# Patient Record
Sex: MALE | Race: WHITE | NOT HISPANIC OR LATINO | Employment: FULL TIME | URBAN - NONMETROPOLITAN AREA
[De-identification: names, ages, dates, MRNs, and addresses within clinical notes are randomized per-mention and may not be internally consistent; named-entity substitution may affect disease eponyms.]

---

## 2020-03-04 ENCOUNTER — OCCUPATIONAL MEDICINE (OUTPATIENT)
Dept: URGENT CARE | Facility: PHYSICIAN GROUP | Age: 25
End: 2020-03-04
Payer: COMMERCIAL

## 2020-03-04 VITALS
DIASTOLIC BLOOD PRESSURE: 78 MMHG | HEART RATE: 68 BPM | TEMPERATURE: 98.2 F | OXYGEN SATURATION: 97 % | BODY MASS INDEX: 39.96 KG/M2 | RESPIRATION RATE: 16 BRPM | HEIGHT: 72 IN | SYSTOLIC BLOOD PRESSURE: 116 MMHG | WEIGHT: 295 LBS

## 2020-03-04 DIAGNOSIS — T15.92XA FOREIGN BODY OF LEFT EYE, INITIAL ENCOUNTER: ICD-10-CM

## 2020-03-04 PROCEDURE — 99204 OFFICE O/P NEW MOD 45 MIN: CPT | Performed by: PHYSICIAN ASSISTANT

## 2020-03-04 RX ORDER — ERYTHROMYCIN 5 MG/G
OINTMENT OPHTHALMIC
Qty: 1 TUBE | Refills: 0 | Status: SHIPPED | OUTPATIENT
Start: 2020-03-04 | End: 2022-10-25

## 2020-03-04 NOTE — LETTER
[facility]  [address] [place] [address] Sofia  CINDY Hall [address]  Phone:  [phone] - Fax:  [phone]   [facility] Progress Report and Disability Certification  Date of Service: 3/4/2020   No Show:  No  Date / Time of Next Visit: 3/18/2020   Claim Information   Patient Name: Maciej Cosme  Claim Number:     Employer:   [employer]  Date of Injury: 3/2/2020     Insurer / TPA: [employer]  ID / SSN:     Occupation:   Diagnosis: The encounter diagnosis was Foreign body of left eye, initial encounter.    Medical Information   Related to Industrial Injury? Yes    Subjective Complaints:  DOI: 3/2/2020, initial evaluation  FB sensation in left eye started 3/2/2020 without specific incident. Has slightly worsened. Denies pain and specific foreign body sensation, describes only itching. Slightly light sensitive, causes tearing with bright lights. Other places of employment: none. Contact lenses: none. Denies blurry vision and history of eye issues.  Feels he can work unrestricted.   Objective Findings: mild conjunctival injection noted in the left eye only with excessive tearing.  Speck of a black foreign body is noted in the left eye at the border of the iris at approximately the 9 o'clock position.  Unable to palpate foreign body with the smooth edge of an 18-gauge needle.  Fluorescein uptake is noted only in this area.   Pre-Existing Condition(s):     Assessment:   Initial Visit    Status: Additional Care Required  Permanent Disability:No    Plan: Medication    Diagnostics:      Comments:  Assessment/Plan:  Patient feels as though he is able to work unrestricted.  Referring patient optometry for further evaluation management of the foreign body.  Return to clinic in 2 weeks for reevaluation, sooner for any significant changes in sympto  ms.  Ointment provided for comfort until seen by optometry.  1. Foreign body of left eye, initial encounter  REFERRAL TO  OPTOMETRY        Disability Information   Status: Released to Full Duty    From:  3/4/2020  Through: 3/18/2020 Restrictions are:     Physical Restrictions   Sitting:    Standing:    Stooping:    Bending:      Squatting:    Walking:    Climbing:    Pushing:      Pulling:    Other:    Reaching Above Shoulder (L):   Reaching Above Shoulder (R):       Reaching Below Shoulder (L):    Reaching Below Shoulder (R):      Not to exceed Weight Limits   Carrying(hrs):   Weight Limit(lb):   Lifting(hrs):   Weight  Limit(lb):     Comments:      Repetitive Actions   Hands: i.e. Fine Manipulations from Grasping:     Feet: i.e. Operating Foot Controls:     Driving / Operate Machinery:     Physician Name: Gwen Matthews P.A.-C. Physician Signature: GWEN Banks P.A.-C. e-Signature: Dr. Bright Napoles, Medical Director   Clinic Name / Location: [address] [address] Clinic Phone Number: Dept: [phone]   Appointment Time: 1:00 Pm Visit Start Time: 1:12 PM   Check-In Time:  1:09 Pm Visit Discharge Time:  1:47 pm    Original-Treating Physician or Chiropractor    Page 2-Insurer/TPA    Page 3-Employer    Page 4-Employee

## 2020-03-04 NOTE — PROGRESS NOTES
Chief Complaint   Patient presents with   • Foreign Body in Eye       HISTORY OF PRESENT ILLNESS: Patient is a 24 y.o. male who presents today for the following:    DOI: 3/2/2020, initial evaluation  FB sensation in left eye started 3/2/2020 without specific incident. Has slightly worsened. Denies pain and specific foreign body sensation, describes only itching. Slightly light sensitive, causes tearing with bright lights. Other places of employment: none. Contact lenses: none. Denies blurry vision and history of eye issues.  Feels he can work unrestricted.    Allergies:Patient has no known allergies.    PMH: No pertinent past medical history to this problem  MEDS: Medications were reviewed in Epic  ALLERGIES: Allergies were reviewed in Epic  SOCHX: Works as a  for [employer]  FH: No pertinent family history to this problem    Review of Systems:  Constitutional ROS: No unexpected change in weight, No weakness, No fatigue  Eye ROS: Left eye itching.  Skin/Integumentary ROS: No edema, No evidence of rash, No itching      Exam:  /78 (BP Location: Right arm, Patient Position: Sitting, BP Cuff Size: Large adult)   Pulse 68   Temp 36.8 °C (98.2 °F) (Temporal)   Resp 16   Ht 1.829 m (6')   Wt (!) 133.8 kg (295 lb)   SpO2 97%   General: Well developed, well nourished. No distress.  Eye: PERRL/EOMI; mild conjunctival injection noted in the left eye only with excessive tearing.  Speck of a black foreign body is noted in the left eye at the border of the iris at approximately the 9 o'clock position.  Unable to palpate foreign body with the smooth edge of an 18-gauge needle.  Fluorescein uptake is noted only in this area.  Pulmonary: Unlabored respiratory effort.   Neurologic: Grossly nonfocal. No facial asymmetry noted.  Skin: Warm, dry, good turgor. No rashes in visible areas.   Psych: Normal mood. Alert and oriented x3. Judgment and insight is normal.    Visual acuity: 20/20 both individually and  together, corrected    Assessment/Plan:  Patient feels as though he is able to work unrestricted.  Referring patient optometry for further evaluation management of the foreign body.  Return to clinic in 2 weeks for reevaluation, sooner for any significant changes in symptoms.    Contact information provided for a local eye care center in [place], [place], that will frequently see Worker's Compensation even prior to approval.  1. Foreign body of left eye, initial encounter  REFERRAL TO OPTOMETRY    erythromycin 5 MG/GM Ointment

## 2020-03-04 NOTE — LETTER
EMPLOYEE’S CLAIM FOR COMPENSATION/ REPORT OF INITIAL TREATMENT  FORM C-4    EMPLOYEE’S CLAIM - PROVIDE ALL INFORMATION REQUESTED   First Name  Maciej Last Name  Carmelina Birthdate                    1995                Sex  male Claim Number   Home Address  [address] Age  24 y.o. Height  1.829 m (6') Weight  (!) 133.8 kg (295 lb) N     [facility] Zip  [address] Telephone  [phone] (home)    Mailing Address  [address] [facility] Zip  [address] Primary Language Spoken  English    Insurer  NA Third Party   [employer]   Employee's Occupation (Job Title) When Injury or Occupational Disease Occurred      Employer's Name    [employer]  Telephone   [phone]   Employer Address   [address] [place] [address]   [id number]   Date of Injury  3/2/2020               Hour of Injury  1:40 PM Date Employer Notified  3/4/2020 Last Day of Work after Injury or Occupational Disease  3/4/2020 Supervisor to Whom Injury Reported  Damion Gonzáles   Address or Location of Accident (if applicable)  [[address] [place] [address]]   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 (Be specific an answer in detail. Use additional sheet if necessary)   debris or hot slag from welding landed in my eye.   If you believe that you have an occupational disease, when did you first have knowledge of the disability and it relationship to your employment?  NA Witnesses to the Accident  NA      Nature of Injury or Occupational Disease  Foreign Body  Part(s) of Body Injured or Affected  Eye (L), ,     I certify that the above is true and correct to the best of my knowledge and that I have provided this information in order to obtain the benefits of [place]  I hereby authorize any physician, chiropractor, surgeon, practitioner, or other person, any hospital, including [facility] or [facility] hospital, any medical service organization, any insurance company, or other institution or organization to release to each other, any medical or other information, including benefits paid or payable, pertinent to this injury or disease, except information relative to diagnosis, treatment and/or counseling for AIDS, psychological conditions, alcohol or controlled substances, for which I must give specific authorization.  A Photostat of this authorization shall be as valid as the original.     Date 03/04/2020   Place [facility]   Employee’s Signature   THIS REPORT MUST BE COMPLETED AND MAILED WITHIN 3 WORKING DAYS OF TREATMENT   Place  [facility]  Name of Facility  [facility]   Date  3/4/2020 Diagnosis  (T15.92XA) Foreign body of left eye, initial encounter Is there evidence the injured employee was under the influence of alcohol and/or another controlled substance at the time of accident?   Hour  1:12 PM Description of Injury or Disease  The encounter diagnosis was Foreign body of left eye, initial encounter. No   Treatment  Assessment/Plan:  Patient feels as though he is able to work unrestricted.  Referring patient optometry for further evaluation management of the foreign body.  Return to clinic in 2 weeks for reevaluation, sooner for any significant changes in symptoms.  Erythromycin ointment provided for comfort until seen by optometry.  1. Foreign body of left eye, initial encounter  REFERRAL TO OPTOMETRY      Have you advised the patient to remain off work five days or more? No   X-Ray Findings      If Yes   From Date  To Date      From information given by the employee, together with medical evidence, can you directly connect this injury or occupational disease as job incurred?  Yes If No Full Duty Yes  "Modified Duty      Is additional medical care by a physician indicated?  Yes If Modified Duty, Specify any Limitations / Restrictions      Do you know of any previous injury or disease contributing to this condition or occupational disease?                            No   Date  3/4/2020 Print Doctor’s Name Gwen Matthews P.A.-C. I certify the employer’s copy of  this form was mailed on:   Address  [address] Insurer’s Use Only     [facility]  [address]    Provider’s Tax ID Number  [id number] Telephone  Dept: [phone]        e-GWEN Good P.A.-C.   e-Signature: Dr. Bright Napoles,   Medical Director Degree  MD        ORIGINAL-TREATING PHYSICIAN OR CHIROPRACTOR    PAGE 2-INSURER/TPA    PAGE 3-EMPLOYER    PAGE 4-EMPLOYEE             Form C-4 (rev.10/07)              BRIEF DESCRIPTION OF RIGHTS AND BENEFITS  (Pursuant to [place]RS 616C.050)    Notice of Injury or Occupational Disease (Incident Report Form C-1): If an injury or occupational disease (OD) arises out of and in the course of employment, you must provide written notice to your employer as soon as practicable, but no later than 7 days after the accident or OD. Your employer shall maintain a sufficient supply of the required forms.    Claim for Compensation (Form C-4): If medical treatment is sought, the form C-4 is available at the place of initial treatment. A completed \"Claim for Compensation\" (Form C-4) must be filed within 90 days after an accident or OD. The treating physician or chiropractor must, within 3 working days after treatment, complete and mail to the employer, the employer's insurer and third-party , the Claim for Compensation.    Medical Treatment: If you require medical treatment for your on-the-job injury or OD, you may be required to select a physician or chiropractor from a list provided by your workers’ compensation insurer, if it has contracted with an Organization for Managed Care (MCO) or " Preferred Provider Organization (PPO) or providers of health care. If your employer has not entered into a contract with an MCO or PPO, you may select a physician or chiropractor from the Panel of Physicians and Chiropractors. Any medical costs related to your industrial injury or OD will be paid by your insurer.    Temporary Total Disability (TTD): If your doctor has certified that you are unable to work for a period of at least 5 consecutive days, or 5 cumulative days in a 20-day period, or places restrictions on you that your employer does not accommodate, you may be entitled to TTD compensation.    Temporary Partial Disability (TPD): If the wage you receive upon reemployment is less than the compensation for TTD to which you are entitled, the insurer may be required to pay you TPD compensation to make up the difference. TPD can only be paid for a maximum of 24 months.    Permanent Partial Disability (PPD): When your medical condition is stable and there is an indication of a PPD as a result of your injury or OD, within 30 days, your insurer must arrange for an evaluation by a rating physician or chiropractor to determine the degree of your PPD. The amount of your PPD award depends on the date of injury, the results of the PPD evaluation and your age and wage.    Permanent Total Disability (PTD): If you are medically certified by a treating physician or chiropractor as permanently and totally disabled and have been granted a PTD status by your insurer, you are entitled to receive monthly benefits not to exceed 66 2/3% of your average monthly wage. The amount of your PTD payments is subject to reduction if you previously received a PPD award.    Vocational Rehabilitation Services: You may be eligible for vocational rehabilitation services if you are unable to return to the job due to a permanent physical impairment or permanent restrictions as a result of your injury or occupational disease.    Transportation and  Per Bonnie Reimbursement: You may be eligible for travel expenses and per bonnie associated with medical treatment.    Reopening: You may be able to reopen your claim if your condition worsens after claim closure.    Appeal Process: If you disagree with a written determination issued by the insurer or the insurer does not respond to your request, you may appeal to the Department of Administration, , by following the instructions contained in your determination letter. You must appeal the determination within 70 days from the date of the determination letter at [address], [address], [place], [place] [address], or [address], [address], [place], [place] [address]. If you disagree with the  decision, you may appeal to the Department of Administration, . You must file your appeal within 30 days from the date of the  decision letter at [address], [address], [place], [place] [address], or [address], [place], [place] [address]. If you disagree with a decision of an , you may file a petition for judicial review with the District Court. You must do so within 30 days of the Appeal Officer’s decision. You may be represented by an  at your own expense or you may contact the [facility] for possible representation.    [place]  for Injured Workers (NAIW): If you disagree with a  decision, you may request that NAIW represent you without charge at an  Hearing. For information regarding denial of benefits, you may contact the [facility] at: [address], [address], [place] [address], [phone], or [address], [place] [address], [phone]    To File a Complaint with the Division: If you wish to file a complaint with the  of the Division of Industrial Relations (DIR),  please contact the Workers’ Compensation Section, [address], [address], [place], [place] [address], telephone [phone]) [phone], or [address], [address], [place], [place] [address], telephone [phone].    For assistance with Workers’ Compensation Issues: You may contact the Office of the [facility] Consumer Health Assistance, [address] [place] [address], [address], [place], [place] [address], Toll Free 1-755.798.2582, Web site: http://[employer].[facility].nv., E-mail [email or web address].           03/04/2020                 __________________________________________________________________                                                     _________        Employee Name / Signature                                                                                                                                              Date                                                                                                                                                                                                     D-2 (rev. 06/18)

## 2022-10-25 ENCOUNTER — OFFICE VISIT (OUTPATIENT)
Dept: URGENT CARE | Facility: PHYSICIAN GROUP | Age: 27
End: 2022-10-25
Payer: COMMERCIAL

## 2022-10-25 VITALS
DIASTOLIC BLOOD PRESSURE: 72 MMHG | OXYGEN SATURATION: 97 % | RESPIRATION RATE: 18 BRPM | SYSTOLIC BLOOD PRESSURE: 128 MMHG | WEIGHT: 249 LBS | TEMPERATURE: 96.6 F | HEIGHT: 74 IN | HEART RATE: 105 BPM | BODY MASS INDEX: 31.95 KG/M2

## 2022-10-25 DIAGNOSIS — J10.1 INFLUENZA B: ICD-10-CM

## 2022-10-25 DIAGNOSIS — J06.9 URI WITH COUGH AND CONGESTION: ICD-10-CM

## 2022-10-25 DIAGNOSIS — J02.9 PHARYNGITIS, UNSPECIFIED ETIOLOGY: ICD-10-CM

## 2022-10-25 DIAGNOSIS — R19.7 DIARRHEA, UNSPECIFIED TYPE: ICD-10-CM

## 2022-10-25 LAB
EXTERNAL QUALITY CONTROL: NORMAL
FLUAV+FLUBV AG SPEC QL IA: NORMAL
INT CON NEG: NORMAL
INT CON POS: NORMAL
S PYO AG THROAT QL: NEGATIVE
SARS-COV+SARS-COV-2 AG RESP QL IA.RAPID: NEGATIVE

## 2022-10-25 PROCEDURE — 87426 SARSCOV CORONAVIRUS AG IA: CPT | Performed by: NURSE PRACTITIONER

## 2022-10-25 PROCEDURE — 99213 OFFICE O/P EST LOW 20 MIN: CPT | Performed by: NURSE PRACTITIONER

## 2022-10-25 PROCEDURE — 87880 STREP A ASSAY W/OPTIC: CPT | Performed by: NURSE PRACTITIONER

## 2022-10-25 PROCEDURE — 87804 INFLUENZA ASSAY W/OPTIC: CPT | Performed by: NURSE PRACTITIONER

## 2022-10-25 ASSESSMENT — ENCOUNTER SYMPTOMS
CHILLS: 0
SINUS PAIN: 0
NAUSEA: 0
DIARRHEA: 1
BLOOD IN STOOL: 0
SORE THROAT: 1
WHEEZING: 0
HEMOPTYSIS: 0
COUGH: 1
FEVER: 0
MYALGIAS: 1
SPUTUM PRODUCTION: 0
VOMITING: 0
SHORTNESS OF BREATH: 0
ABDOMINAL PAIN: 0

## 2022-10-25 NOTE — LETTER
October 25, 2022         Patient: Maciej Cosme   YOB: 1995   Date of Visit: 10/25/2022           To Whom it May Concern:    Maciej Cosme was seen in my clinic on 10/25/2022 for influenza (the flu).  Please excuse his absence due to acute illness. He may return to work in 2-3 days when symptoms improve.    If you have any questions or concerns, please don't hesitate to call.        Sincerely,           LETICIA Harden.  Electronically Signed

## 2022-10-25 NOTE — PROGRESS NOTES
"Subjective     Maciej Cosme is a 27 y.o. male who presents with Sinus Problem (X1 week, sinus issues, cough, weak, fatigue, Sunday diarrhea/ )            Maciej comes in today with a 1 week history of URI with nasal congestion, sore throat, cough, weakness, fatigue and new onset of diarrhea 2 days ago.  He is taking OTC cold meds with no relief.  He denies any known exposure to strep throat, covid or influenza.        Review of Systems   Constitutional:  Positive for malaise/fatigue. Negative for chills and fever.   HENT:  Positive for congestion and sore throat. Negative for ear pain and sinus pain.    Respiratory:  Positive for cough. Negative for hemoptysis, sputum production, shortness of breath and wheezing.    Cardiovascular:  Negative for chest pain.   Gastrointestinal:  Positive for diarrhea. Negative for abdominal pain, blood in stool, melena, nausea and vomiting.   Musculoskeletal:  Positive for myalgias.      Medications, Allergies, and current problem list reviewed today in Epic      Objective     Blood Pressure 128/72   Pulse (Abnormal) 105   Temperature 35.9 °C (96.6 °F) (Temporal)   Respiration 18   Height 1.88 m (6' 2\")   Weight 113 kg (249 lb)   Oxygen Saturation 97%   Body Mass Index 31.97 kg/m²      Physical Exam  Vitals reviewed.   Constitutional:       General: He is not in acute distress.     Appearance: He is well-developed. He is not ill-appearing, toxic-appearing or diaphoretic.      Comments: Fatigued.   HENT:      Right Ear: Tympanic membrane, ear canal and external ear normal. There is no impacted cerumen.      Left Ear: Tympanic membrane, ear canal and external ear normal. There is no impacted cerumen.      Nose: Congestion and rhinorrhea present.      Comments: No sinus TTP.     Mouth/Throat:      Mouth: Mucous membranes are moist.      Pharynx: Posterior oropharyngeal erythema present. No oropharyngeal exudate.   Eyes:      General: No scleral icterus.        Right eye: No " discharge.         Left eye: No discharge.      Conjunctiva/sclera: Conjunctivae normal.   Neck:      Thyroid: No thyromegaly.      Vascular: No JVD.      Trachea: No tracheal deviation.   Cardiovascular:      Rate and Rhythm: Regular rhythm. Tachycardia present.      Heart sounds: Normal heart sounds. No murmur heard.    No friction rub. No gallop.   Pulmonary:      Effort: Pulmonary effort is normal. No respiratory distress.      Breath sounds: Normal breath sounds. No stridor. No wheezing, rhonchi or rales.      Comments: Non-productive cough in clinic.  Chest:      Chest wall: No tenderness.   Musculoskeletal:      Cervical back: Neck supple.   Lymphadenopathy:      Cervical: Cervical adenopathy present.   Skin:     General: Skin is warm and dry.      Coloration: Skin is not jaundiced or pale.      Findings: No rash.   Neurological:      Mental Status: He is alert and oriented to person, place, and time.           POCT rapid strep a: negative  POCT covid antigen:negative  POCT influenza a/b: Positive for B (negative for a)                Assessment & Plan        1. Influenza B        2. URI with cough and congestion  POCT SARS-COV Antigen SALOME (Symptomatic only)    POCT Influenza A/B      3. Pharyngitis, unspecified etiology  POCT Rapid Strep A      4. Diarrhea, unspecified type            Advised Maciej that based on the history and exam findings, this is likely a self-limiting viral illness.  There is no indication for antibiotics at this time.  Out of range to benefit from tamiflu.  Differential reviewed.    OTC NSAIDs or tylenol prn fever, pain.  OTC cold medications prn symptom management.  Maintain adequate po hydration.  RTC in 5-7 days if symptoms persist, sooner if worse.  He verbalized understanding of and agreed with plan of care.

## 2022-12-30 ENCOUNTER — APPOINTMENT (OUTPATIENT)
Dept: URGENT CARE | Facility: PHYSICIAN GROUP | Age: 27
End: 2022-12-30
Payer: COMMERCIAL

## 2022-12-31 ENCOUNTER — OFFICE VISIT (OUTPATIENT)
Dept: URGENT CARE | Facility: PHYSICIAN GROUP | Age: 27
End: 2022-12-31
Payer: COMMERCIAL

## 2022-12-31 VITALS
SYSTOLIC BLOOD PRESSURE: 120 MMHG | TEMPERATURE: 96.9 F | DIASTOLIC BLOOD PRESSURE: 80 MMHG | HEART RATE: 84 BPM | BODY MASS INDEX: 39.91 KG/M2 | OXYGEN SATURATION: 96 % | WEIGHT: 311 LBS | HEIGHT: 74 IN | RESPIRATION RATE: 18 BRPM

## 2022-12-31 DIAGNOSIS — J22 ACUTE RESPIRATORY INFECTION: ICD-10-CM

## 2022-12-31 DIAGNOSIS — Z11.52 ENCOUNTER FOR SCREENING FOR COVID-19: ICD-10-CM

## 2022-12-31 DIAGNOSIS — R68.89 FLU-LIKE SYMPTOMS: ICD-10-CM

## 2022-12-31 DIAGNOSIS — J02.9 ACUTE PHARYNGITIS, UNSPECIFIED ETIOLOGY: ICD-10-CM

## 2022-12-31 LAB
EXTERNAL QUALITY CONTROL: NORMAL
FLUAV+FLUBV AG SPEC QL IA: NORMAL
INT CON NEG: NORMAL
INT CON POS: NORMAL
S PYO AG THROAT QL: NORMAL
SARS-COV+SARS-COV-2 AG RESP QL IA.RAPID: NEGATIVE

## 2022-12-31 PROCEDURE — 99213 OFFICE O/P EST LOW 20 MIN: CPT | Mod: CS | Performed by: FAMILY MEDICINE

## 2022-12-31 PROCEDURE — 87804 INFLUENZA ASSAY W/OPTIC: CPT | Performed by: FAMILY MEDICINE

## 2022-12-31 PROCEDURE — 87880 STREP A ASSAY W/OPTIC: CPT | Performed by: FAMILY MEDICINE

## 2022-12-31 PROCEDURE — 87426 SARSCOV CORONAVIRUS AG IA: CPT | Performed by: FAMILY MEDICINE

## 2022-12-31 NOTE — LETTER
December 31, 2022         Patient: Maciej Cosme   YOB: 1995   Date of Visit: 12/31/2022           To Whom it May Concern:    Maciej Cosme was seen in my clinic on 12/31/2022.     He had negative Strep, Flu, and Covid tests in clinic today.    Please excuse from work for 12/28 thru and including 12/30/22 due to medical condition.    May return to work on 1/3/23.    If you have any questions or concerns, please don't hesitate to call.        Sincerely,           Froylan Cervantes M.D.  Electronically Signed

## 2022-12-31 NOTE — PROGRESS NOTES
"Chief Complaint:    Chief Complaint   Patient presents with    Sinus Problem     Wednesday and Thursday, felt better friday    Congestion     Slight congestion       History of Present Illness:    Started with symptoms on 12/28/22, getting better, still with some mild nasal symptoms, but is here to get multiple tests for \"whatever is going around\", so he can return to work. Had sore throat which has improved.      Past Medical History:    No past medical history on file.    Past Surgical History:    No past surgical history on file.    Social History:    Social History     Socioeconomic History    Marital status: Single     Spouse name: Not on file    Number of children: Not on file    Years of education: Not on file    Highest education level: Not on file   Occupational History    Not on file   Tobacco Use    Smoking status: Never    Smokeless tobacco: Never   Vaping Use    Vaping Use: Never used   Substance and Sexual Activity    Alcohol use: Not Currently    Drug use: Never    Sexual activity: Not on file   Other Topics Concern    Not on file   Social History Narrative    Not on file     Social Determinants of Health     Financial Resource Strain: Not on file   Food Insecurity: Not on file   Transportation Needs: Not on file   Physical Activity: Not on file   Stress: Not on file   Social Connections: Not on file   Intimate Partner Violence: Not on file   Housing Stability: Not on file     Family History:    No family history on file.    Medications:    No current outpatient medications on file prior to visit.     No current facility-administered medications on file prior to visit.     Allergies:    No Known Allergies      Vitals:    Vitals:    12/31/22 1135   BP: 120/80   Pulse: 84   Resp: 18   Temp: 36.1 °C (96.9 °F)   TempSrc: Temporal   SpO2: 96%   Weight: (!) 141 kg (311 lb)   Height: 1.88 m (6' 2\")       Physical Exam:    Constitutional: Vital signs reviewed. Appears well-developed and well-nourished. No " "acute distress.   Eyes: Sclera white, conjunctivae clear.   ENT: Bilateral impacted cerumen. External ears normal. Hearing normal. Lips/teeth are normal. Oral mucosa pink and moist. Posterior pharynx: mildly erythematous.  Neck: Neck supple.   Cardiovascular: Regular rate and rhythm. No murmur.  Pulmonary/Chest: Respirations non-labored. Clear to auscultation bilaterally.  Musculoskeletal: Normal gait. No muscular atrophy or weakness.  Neurological: Alert and oriented to person, place, and time. Muscle tone normal. Coordination normal.   Skin: No rashes or lesions. Warm, dry, normal turgor.  Psychiatric: Normal mood and affect. Behavior is normal. Judgment and thought content normal.       Diagnostics:    Rapid Strep test is negative.    Rapid Flu test is negative.    POC Covid test is negative.      Medical Decision Makin. Encounter for screening for COVID-19  - POCT SARS-COV Antigen SALOME (Symptomatic only)    2. Flu-like symptoms  - POCT Influenza A/B    3. Acute pharyngitis, unspecified etiology  - POCT Rapid Strep A    4. Acute respiratory infection      Work note given - He had negative Strep, Flu, and Covid tests in clinic today. Excuse for  thru and including 22. May return to work on 1/3/23.    Discussed with him DDX, management options, and risks, benefits, and alternatives to treatment plan agreed upon.    Started with symptoms on 22, getting better, still with some mild nasal symptoms, but is here to get multiple tests for \"whatever is going around\", so he can return to work. Had sore throat which has improved.    Bilateral impacted cerumen. Posterior pharynx: mildly erythematous.    Rapid Strep test is negative.    Rapid Flu test is negative.    POC Covid test is negative.    Recommended further observation and see if symptoms will self-resolve as likely viral etiology at this time.    He will return to urgent care if needed.   "

## 2023-12-26 ENCOUNTER — OFFICE VISIT (OUTPATIENT)
Dept: URGENT CARE | Facility: PHYSICIAN GROUP | Age: 28
End: 2023-12-26
Payer: COMMERCIAL

## 2023-12-26 VITALS
WEIGHT: 300 LBS | RESPIRATION RATE: 14 BRPM | OXYGEN SATURATION: 97 % | HEIGHT: 74 IN | SYSTOLIC BLOOD PRESSURE: 132 MMHG | BODY MASS INDEX: 38.5 KG/M2 | TEMPERATURE: 97.7 F | HEART RATE: 77 BPM | DIASTOLIC BLOOD PRESSURE: 80 MMHG

## 2023-12-26 DIAGNOSIS — H61.22 IMPACTED CERUMEN OF LEFT EAR: ICD-10-CM

## 2023-12-26 DIAGNOSIS — S00.412A EAR CANAL ABRASION, LEFT, INITIAL ENCOUNTER: ICD-10-CM

## 2023-12-26 PROCEDURE — 99213 OFFICE O/P EST LOW 20 MIN: CPT | Performed by: PHYSICIAN ASSISTANT

## 2023-12-26 PROCEDURE — 3075F SYST BP GE 130 - 139MM HG: CPT | Performed by: PHYSICIAN ASSISTANT

## 2023-12-26 PROCEDURE — 3079F DIAST BP 80-89 MM HG: CPT | Performed by: PHYSICIAN ASSISTANT

## 2023-12-26 NOTE — PROGRESS NOTES
"Subjective:   Maciej Cosme is a 28 y.o. male who presents for Otalgia (X3-4 days L ear pain, feels clogged, trouble hearing )      HPI  The patient presents to the Urgent Care with complaints of left ear pain.  Left ear fullness 1 week ago so he cleaned his ear with a Q-tip and hydrogen peroxide.  Pain described as a pressure that gradually started with decreased hearing.  No ear drainage.  Some recent sinus congestion.  No fevers or chills.        No past medical history on file.  No Known Allergies     Objective:     /80   Pulse 77   Temp 36.5 °C (97.7 °F) (Temporal)   Resp 14   Ht 1.88 m (6' 2\")   Wt (!) 136 kg (300 lb)   SpO2 97%   BMI 38.52 kg/m²     Physical Exam  Vitals reviewed.   Constitutional:       General: He is not in acute distress.     Appearance: Normal appearance. He is not ill-appearing or toxic-appearing.   HENT:      Right Ear: Tympanic membrane, ear canal and external ear normal.      Left Ear: There is impacted cerumen.   Eyes:      Conjunctiva/sclera: Conjunctivae normal.   Cardiovascular:      Rate and Rhythm: Normal rate.   Pulmonary:      Effort: Pulmonary effort is normal.   Musculoskeletal:      Cervical back: Neck supple.   Skin:     General: Skin is warm and dry.   Neurological:      General: No focal deficit present.      Mental Status: He is alert and oriented to person, place, and time.   Psychiatric:         Mood and Affect: Mood normal.         Behavior: Behavior normal.         Diagnosis and associated orders:     1. Impacted cerumen of left ear    2. Ear canal abrasion, left, initial encounter  - ciprofloxacin/dexamethasone (CIPRODEX) 0.3-0.1 % Suspension; Instill 4 drops into affected ear(s) twice per day for 7 days  Dispense: 7.5 mL; Refill: 0       Comments/MDM:     Irrigation by MA of ear canal of left ear attempted and some cerumen removed. I then used curette to remove more from ear canal without complications. MA attempted again to irrigate and cerumen " successfully disimpacted. Visualization of canal showed small abrasion with some blood in canal. No continued bleeding. TM normal. No perforation.   Will provide prophylactic ciprofloxacin/dexamethasone drops   Patient states they can hear a lot better.   Avoid inserting Q-tips inside ear canal.   Preventative measures discussed.   Patient has no further questions or concerns.       Pathogenesis of diagnosis discussed including typical length and natural progression. Supportive care, natural history, differential diagnoses, and indications for immediate follow-up discussed. Patient expresses understanding and agrees to plan. Patient denies any other questions or concerns.     Follow-up with the primary care physician for recheck, reevaluation, and consideration of further management.    Time spent evaluating the patient was 22 minutes which included preparing for the visit, obtaining history, examination, ordering labs/tests/procedures/medications, independent interpretation, discussion of plan, counseling/education, and documentation into chart.     Please note that this dictation was created using voice recognition software. I have made a reasonable attempt to correct obvious errors, but I expect that there are errors of grammar and possibly content that I did not discover before finalizing the note.    This note was electronically signed by Chun Mahmood PA-C

## 2023-12-27 RX ORDER — CIPROFLOXACIN AND DEXAMETHASONE 3; 1 MG/ML; MG/ML
SUSPENSION/ DROPS AURICULAR (OTIC)
Qty: 7.5 ML | Refills: 0 | Status: SHIPPED | OUTPATIENT
Start: 2023-12-27 | End: 2024-03-23

## 2024-03-23 ENCOUNTER — OFFICE VISIT (OUTPATIENT)
Dept: URGENT CARE | Facility: PHYSICIAN GROUP | Age: 29
End: 2024-03-23
Payer: COMMERCIAL

## 2024-03-23 VITALS
WEIGHT: 300.5 LBS | HEIGHT: 74 IN | TEMPERATURE: 96.9 F | HEART RATE: 72 BPM | OXYGEN SATURATION: 98 % | RESPIRATION RATE: 16 BRPM | DIASTOLIC BLOOD PRESSURE: 80 MMHG | SYSTOLIC BLOOD PRESSURE: 122 MMHG | BODY MASS INDEX: 38.56 KG/M2

## 2024-03-23 DIAGNOSIS — J01.90 ACUTE BACTERIAL SINUSITIS: ICD-10-CM

## 2024-03-23 DIAGNOSIS — B96.89 ACUTE BACTERIAL SINUSITIS: ICD-10-CM

## 2024-03-23 PROCEDURE — 3079F DIAST BP 80-89 MM HG: CPT | Performed by: NURSE PRACTITIONER

## 2024-03-23 PROCEDURE — 99213 OFFICE O/P EST LOW 20 MIN: CPT | Performed by: NURSE PRACTITIONER

## 2024-03-23 PROCEDURE — 3074F SYST BP LT 130 MM HG: CPT | Performed by: NURSE PRACTITIONER

## 2024-03-23 RX ORDER — AMOXICILLIN AND CLAVULANATE POTASSIUM 875; 125 MG/1; MG/1
1 TABLET, FILM COATED ORAL 2 TIMES DAILY
Qty: 14 TABLET | Refills: 0 | Status: SHIPPED | OUTPATIENT
Start: 2024-03-23 | End: 2024-03-30

## 2024-03-23 RX ORDER — FLUTICASONE PROPIONATE 50 MCG
1 SPRAY, SUSPENSION (ML) NASAL DAILY
Qty: 16 G | Refills: 0 | Status: SHIPPED | OUTPATIENT
Start: 2024-03-23

## 2024-03-23 NOTE — LETTER
[facility] [address] [address]     March 23, 2024    Patient: Maciej Cosme   YOB: 1995   Date of Visit: 3/23/2024       To Whom It May Concern:    Maciej Cosme was seen and treated in our department on 3/23/2024.  Will need to be excused work due to illness and may return on 3/25/2024 without restrictions.    Sincerely,     LETICIA Palmer.

## 2024-03-23 NOTE — PROGRESS NOTES
"Subjective:   Maciej Cosme is a 28 y.o. male who presents for Nasal Congestion (X5 days Congestion, mostly on R side, slight cough, head cold., needs note for work )    Patient is a 28-year-old male presented clinic today reporting 5-day history of sinus pain and pressure, nasal congestion, slight cough, headache.  He states his symptoms seem to be worse on the right side but occasionally wax and wane on the left side.  Symptoms are worse first thing in the morning and do improve throughout the day.  He has been taking over-the-counter DayQuil with some symptom improvement.  He has not had any fevers, shortness of breath, or wheezing.    Medications, Allergies, and current problem list reviewed today in Epic.     Objective:     /80   Pulse 72   Temp 36.1 °C (96.9 °F) (Temporal)   Resp 16   Ht 1.88 m (6' 2\")   Wt (!) 136 kg (300 lb 8 oz)   SpO2 98%     Physical Exam  Vitals reviewed.   Constitutional:       General: He is not in acute distress.     Appearance: Normal appearance. He is not ill-appearing or toxic-appearing.   HENT:      Head: Normocephalic.      Right Ear: Ear canal and external ear normal. No middle ear effusion. Tympanic membrane is erythematous.      Left Ear: Tympanic membrane, ear canal and external ear normal.      Nose: Mucosal edema, congestion and rhinorrhea present. Rhinorrhea is purulent.      Right Turbinates: Swollen.      Left Turbinates: Swollen.      Right Sinus: Maxillary sinus tenderness and frontal sinus tenderness present.      Left Sinus: No maxillary sinus tenderness or frontal sinus tenderness.      Mouth/Throat:      Lips: Pink. No lesions.      Mouth: Mucous membranes are moist. No oral lesions.      Pharynx: Oropharynx is clear. Uvula midline. No pharyngeal swelling, oropharyngeal exudate, posterior oropharyngeal erythema or uvula swelling.      Tonsils: No tonsillar exudate or tonsillar abscesses. 0 on the right. 0 on the left.      Comments: Positive for " postnasal drip with purulent discharge  Eyes:      Extraocular Movements: Extraocular movements intact.      Conjunctiva/sclera: Conjunctivae normal.      Pupils: Pupils are equal, round, and reactive to light.   Cardiovascular:      Rate and Rhythm: Normal rate.   Pulmonary:      Effort: Pulmonary effort is normal.   Musculoskeletal:         General: Normal range of motion.      Cervical back: Normal range of motion and neck supple.   Skin:     General: Skin is warm and dry.   Neurological:      General: No focal deficit present.      Mental Status: He is alert and oriented to person, place, and time.   Psychiatric:         Mood and Affect: Mood normal.         Behavior: Behavior normal.         Assessment/Plan:     Diagnosis and associated orders:     1. Acute bacterial sinusitis  amoxicillin-clavulanate (AUGMENTIN) 875-125 MG Tab    fluticasone (FLONASE) 50 MCG/ACT nasal spray         Comments/MDM:     Provided patient with information on the etiology & pathogenesis of bacterial sinusitis.   Recommend cool mist humidifier at home, use nasal saline wash (i.e. Nedi-Pot), may take OTC decongestant prn, and antibiotics as prescribed.   Tylenol/Motrin prn HA or discomfort.   Return to clinic for fever >4d, no improvement within 48-72h, or for any other questions or concerns.     Patient was involved with shared decision-making throughout the exam today and verbalizes understanding regards to plan of care, discharge instructions, and follow-up         Differential diagnosis, natural history, supportive care, and indications for immediate follow-up discussed.    Advised the patient to follow-up with the primary care physician for recheck, reevaluation, and consideration of further management.    I personally reviewed prior external notes and test results pertinent to today's visit as well as additional imaging and testing completed in clinic today.     Please note that this dictation was created using voice recognition  software. I have made a reasonable attempt to correct obvious errors, but I expect that there are errors of grammar and possibly content that I did not discover before finalizing the note.

## 2024-03-28 ENCOUNTER — OFFICE VISIT (OUTPATIENT)
Dept: URGENT CARE | Facility: PHYSICIAN GROUP | Age: 29
End: 2024-03-28
Payer: COMMERCIAL

## 2024-03-28 VITALS
WEIGHT: 300 LBS | OXYGEN SATURATION: 97 % | RESPIRATION RATE: 16 BRPM | DIASTOLIC BLOOD PRESSURE: 70 MMHG | TEMPERATURE: 97.7 F | HEIGHT: 74 IN | HEART RATE: 67 BPM | SYSTOLIC BLOOD PRESSURE: 126 MMHG | BODY MASS INDEX: 38.5 KG/M2

## 2024-03-28 DIAGNOSIS — J01.40 ACUTE NON-RECURRENT PANSINUSITIS: ICD-10-CM

## 2024-03-28 PROCEDURE — 3078F DIAST BP <80 MM HG: CPT | Performed by: NURSE PRACTITIONER

## 2024-03-28 PROCEDURE — 3074F SYST BP LT 130 MM HG: CPT | Performed by: NURSE PRACTITIONER

## 2024-03-28 PROCEDURE — 99213 OFFICE O/P EST LOW 20 MIN: CPT | Performed by: NURSE PRACTITIONER

## 2024-03-28 NOTE — PROGRESS NOTES
"Subjective:   Maciej Cosme is a 28 y.o. male who presents for Medication Reaction (Took first dose of antibiotics and woke up next day feeling ill, weak, and slept another 4 hours. Unsure if normal or not. Stopped taking it. )      Patient is a 20-year-old male presents clinic today with follow-up due to sinusitis.  Patient states on Saturday he took 1 dose of Augmentin and woke up the next morning just feeling ill and weak.  Throughout the day he did feel better.  Patient did not continue on Augmentin.  He has continued taking the Flonase and states that his sinusitis symptoms are improving.  He states he does continue to have nasal congestion with some yellow and green discharge however is worse first thing in the morning and clears by the afternoon.  He has not had any fevers, chills, or cough.        Medications, Allergies, and current problem list reviewed today in Epic.     Objective:     /70   Pulse 67   Temp 36.5 °C (97.7 °F) (Temporal)   Resp 16   Ht 1.88 m (6' 2\")   Wt (!) 136 kg (300 lb)   SpO2 97%     Physical Exam  Vitals reviewed.   Constitutional:       General: He is not in acute distress.     Appearance: Normal appearance. He is not ill-appearing or toxic-appearing.   HENT:      Head: Normocephalic.      Right Ear: Tympanic membrane, ear canal and external ear normal.      Left Ear: Tympanic membrane, ear canal and external ear normal.      Nose: Mucosal edema and rhinorrhea present. No congestion. Rhinorrhea is clear.      Right Sinus: No maxillary sinus tenderness or frontal sinus tenderness.      Left Sinus: No maxillary sinus tenderness or frontal sinus tenderness.      Mouth/Throat:      Mouth: Mucous membranes are moist.   Eyes:      Extraocular Movements: Extraocular movements intact.      Conjunctiva/sclera: Conjunctivae normal.      Pupils: Pupils are equal, round, and reactive to light.   Cardiovascular:      Rate and Rhythm: Normal rate.   Pulmonary:      Effort: Pulmonary " effort is normal.   Musculoskeletal:         General: Normal range of motion.      Cervical back: Normal range of motion and neck supple.   Skin:     General: Skin is warm and dry.   Neurological:      General: No focal deficit present.      Mental Status: He is alert and oriented to person, place, and time.   Psychiatric:         Mood and Affect: Mood normal.         Behavior: Behavior normal.         Assessment/Plan:     Diagnosis and associated orders:     1. Acute non-recurrent pansinusitis           Comments/MDM:     This acute condition.  Reviewed up-to-date for allergic reaction symptoms due to Augmentin and symptoms were not listed.  Did discuss with patient that this could be possible however it is not a common side effect or adverse effects from the medication.  Discussed with patient possibly just worsening of sinusitis symptoms.  Recommended patient continue off of Augmentin at this time however would not placed on allergy list and recommended trying it again in the future if needed however symptoms occur a second time should put on allergy list.  Recommended patient continue on Flonase and start taking Zyrtec at night.  Follow-up in clinic in the next 3 to 4 days if symptoms or not improving or sooner if they acutely worsen.  Patient was involved with shared decision-making throughout the exam today and verbalizes understanding regards to plan of care, discharge instructions, and follow-up         Differential diagnosis, natural history, supportive care, and indications for immediate follow-up discussed.    Advised the patient to follow-up with the primary care physician for recheck, reevaluation, and consideration of further management.    I personally reviewed prior external notes and test results pertinent to today's visit as well as additional imaging and testing completed in clinic today.     Please note that this dictation was created using voice recognition software. I have made a reasonable  attempt to correct obvious errors, but I expect that there are errors of grammar and possibly content that I did not discover before finalizing the note.

## 2024-06-19 ENCOUNTER — OFFICE VISIT (OUTPATIENT)
Dept: URGENT CARE | Facility: PHYSICIAN GROUP | Age: 29
End: 2024-06-19
Payer: COMMERCIAL

## 2024-06-19 VITALS
HEART RATE: 72 BPM | OXYGEN SATURATION: 97 % | BODY MASS INDEX: 38.89 KG/M2 | TEMPERATURE: 97.6 F | DIASTOLIC BLOOD PRESSURE: 80 MMHG | WEIGHT: 302.9 LBS | RESPIRATION RATE: 16 BRPM | SYSTOLIC BLOOD PRESSURE: 118 MMHG

## 2024-06-19 DIAGNOSIS — H01.001 BLEPHARITIS OF RIGHT UPPER EYELID, UNSPECIFIED TYPE: ICD-10-CM

## 2024-06-19 PROCEDURE — 3079F DIAST BP 80-89 MM HG: CPT | Performed by: NURSE PRACTITIONER

## 2024-06-19 PROCEDURE — 99213 OFFICE O/P EST LOW 20 MIN: CPT | Performed by: NURSE PRACTITIONER

## 2024-06-19 PROCEDURE — 3074F SYST BP LT 130 MM HG: CPT | Performed by: NURSE PRACTITIONER

## 2024-06-19 RX ORDER — ERYTHROMYCIN 5 MG/G
1 OINTMENT OPHTHALMIC 4 TIMES DAILY
Qty: 3.5 G | Refills: 0 | Status: SHIPPED | OUTPATIENT
Start: 2024-06-19 | End: 2024-06-29

## 2024-06-19 ASSESSMENT — VISUAL ACUITY: OU: 1

## 2024-06-19 NOTE — PROGRESS NOTES
Subjective:   Maciej Cosme is a 28 y.o. male who presents for Eye Problem (Right eye swelling, started yesterday. )    Patient is a 20-year-old male presenting clinic today reporting 2-day history of right upper eyelid swelling, redness, and some matting/discharge this morning.  Patient denies any blurred vision, pain in his eye, or pressure in his eye.  No over-the-counter medications have been tried at this time.  Patient does wear glasses however does not wear contacts.  No known foreign body exposure such as sawdust or welding.    Medications, Allergies, and current problem list reviewed today in Epic.     Objective:     /80   Pulse 72   Temp 36.4 °C (97.6 °F) (Temporal)   Resp 16   Wt (!) 137 kg (302 lb 14.4 oz)   SpO2 97%     Physical Exam  Vitals reviewed.   Constitutional:       Appearance: Normal appearance.   HENT:      Head: Normocephalic.      Nose: Nose normal.      Mouth/Throat:      Mouth: Mucous membranes are moist.   Eyes:      General: Vision grossly intact. Gaze aligned appropriately.         Right eye: No foreign body.      Extraocular Movements: Extraocular movements intact.      Conjunctiva/sclera:      Right eye: Right conjunctiva is injected. Chemosis present. No exudate or hemorrhage.     Left eye: Left conjunctiva is not injected. No chemosis, exudate or hemorrhage.     Pupils: Pupils are equal, round, and reactive to light.   Cardiovascular:      Rate and Rhythm: Normal rate.   Pulmonary:      Effort: Pulmonary effort is normal.   Musculoskeletal:         General: Normal range of motion.      Cervical back: Normal range of motion and neck supple.   Skin:     General: Skin is warm and dry.   Neurological:      General: No focal deficit present.      Mental Status: He is alert and oriented to person, place, and time.   Psychiatric:         Mood and Affect: Mood normal.         Behavior: Behavior normal.         Assessment/Plan:     Diagnosis and associated orders:     1.  Blepharitis of right upper eyelid, unspecified type  erythromycin 5 MG/GM Ointment         Comments/MDM:     This acute condition.  Erythromycin ointment prescribed.  Recommended warm moist hot compresses or cool compresses.  Follow-up in clinic or the emergency department if symptoms acutely worsen or if they are not improving in the next 3 days.  Education was provided about good hand hygiene as well as cleaning the nose band of his glasses daily to prevent reinfection.    Patient was involved with shared decision-making throughout the exam today and verbalizes understanding regards to plan of care, discharge instructions, and follow-up         Differential diagnosis, natural history, supportive care, and indications for immediate follow-up discussed.    Advised the patient to follow-up with the primary care physician for recheck, reevaluation, and consideration of further management.    I personally reviewed prior external notes and test results pertinent to today's visit as well as additional imaging and testing completed in clinic today.     Please note that this dictation was created using voice recognition software. I have made a reasonable attempt to correct obvious errors, but I expect that there are errors of grammar and possibly content that I did not discover before finalizing the note.

## 2024-06-19 NOTE — LETTER
[facility] [address] [place]  [facility] [address]     June 19, 2024    Patient: Maciej Cosme   YOB: 1995   Date of Visit: 6/19/2024       To Whom It May Concern:    Maciej Cosme was seen and treated in our department on 6/19/2024.  Will need to be excused from work and may return on 6/20/2024 without restrictions.    Sincerely,     LETICIA Palmer.